# Patient Record
Sex: FEMALE | Race: WHITE | NOT HISPANIC OR LATINO | URBAN - METROPOLITAN AREA
[De-identification: names, ages, dates, MRNs, and addresses within clinical notes are randomized per-mention and may not be internally consistent; named-entity substitution may affect disease eponyms.]

---

## 2018-12-16 ENCOUNTER — EMERGENCY (EMERGENCY)
Facility: HOSPITAL | Age: 48
LOS: 1 days | Discharge: HOME | End: 2018-12-16
Attending: EMERGENCY MEDICINE | Admitting: EMERGENCY MEDICINE
Payer: COMMERCIAL

## 2018-12-16 VITALS
OXYGEN SATURATION: 97 % | HEART RATE: 76 BPM | RESPIRATION RATE: 18 BRPM | SYSTOLIC BLOOD PRESSURE: 140 MMHG | DIASTOLIC BLOOD PRESSURE: 90 MMHG | TEMPERATURE: 98 F | WEIGHT: 160.06 LBS | HEIGHT: 62 IN

## 2018-12-16 VITALS
HEART RATE: 70 BPM | RESPIRATION RATE: 15 BRPM | OXYGEN SATURATION: 98 % | DIASTOLIC BLOOD PRESSURE: 82 MMHG | SYSTOLIC BLOOD PRESSURE: 142 MMHG

## 2018-12-16 LAB
ALBUMIN SERPL ELPH-MCNC: 3.7 G/DL — SIGNIFICANT CHANGE UP (ref 3.3–5)
ALP SERPL-CCNC: 40 U/L — SIGNIFICANT CHANGE UP (ref 30–120)
ALT FLD-CCNC: 24 U/L DA — SIGNIFICANT CHANGE UP (ref 10–60)
ANION GAP SERPL CALC-SCNC: 5 MMOL/L — SIGNIFICANT CHANGE UP (ref 5–17)
APPEARANCE UR: CLEAR — SIGNIFICANT CHANGE UP
APTT BLD: 26.1 SEC — LOW (ref 28.5–37)
AST SERPL-CCNC: 12 U/L — SIGNIFICANT CHANGE UP (ref 10–40)
BACTERIA # UR AUTO: ABNORMAL
BILIRUB SERPL-MCNC: 0.2 MG/DL — SIGNIFICANT CHANGE UP (ref 0.2–1.2)
BILIRUB UR-MCNC: NEGATIVE — SIGNIFICANT CHANGE UP
BUN SERPL-MCNC: 12 MG/DL — SIGNIFICANT CHANGE UP (ref 7–23)
CALCIUM SERPL-MCNC: 8.9 MG/DL — SIGNIFICANT CHANGE UP (ref 8.4–10.5)
CHLORIDE SERPL-SCNC: 101 MMOL/L — SIGNIFICANT CHANGE UP (ref 96–108)
CO2 SERPL-SCNC: 28 MMOL/L — SIGNIFICANT CHANGE UP (ref 22–31)
COLOR SPEC: YELLOW — SIGNIFICANT CHANGE UP
CREAT SERPL-MCNC: 0.66 MG/DL — SIGNIFICANT CHANGE UP (ref 0.5–1.3)
D DIMER BLD IA.RAPID-MCNC: <150 NG/ML DDU — SIGNIFICANT CHANGE UP
DIFF PNL FLD: ABNORMAL
EPI CELLS # UR: ABNORMAL
GLUCOSE SERPL-MCNC: 95 MG/DL — SIGNIFICANT CHANGE UP (ref 70–99)
GLUCOSE UR QL: NEGATIVE MG/DL — SIGNIFICANT CHANGE UP
HCT VFR BLD CALC: 34.6 % — SIGNIFICANT CHANGE UP (ref 34.5–45)
HGB BLD-MCNC: 11.6 G/DL — SIGNIFICANT CHANGE UP (ref 11.5–15.5)
INR BLD: 1.07 RATIO — SIGNIFICANT CHANGE UP (ref 0.88–1.16)
KETONES UR-MCNC: NEGATIVE — SIGNIFICANT CHANGE UP
LEUKOCYTE ESTERASE UR-ACNC: ABNORMAL
MCHC RBC-ENTMCNC: 29.3 PG — SIGNIFICANT CHANGE UP (ref 27–34)
MCHC RBC-ENTMCNC: 33.5 GM/DL — SIGNIFICANT CHANGE UP (ref 32–36)
MCV RBC AUTO: 87.4 FL — SIGNIFICANT CHANGE UP (ref 80–100)
NITRITE UR-MCNC: NEGATIVE — SIGNIFICANT CHANGE UP
NRBC # BLD: 0 /100 WBCS — SIGNIFICANT CHANGE UP (ref 0–0)
PH UR: 8 — SIGNIFICANT CHANGE UP (ref 5–8)
PLATELET # BLD AUTO: 371 K/UL — SIGNIFICANT CHANGE UP (ref 150–400)
POTASSIUM SERPL-MCNC: 3.8 MMOL/L — SIGNIFICANT CHANGE UP (ref 3.5–5.3)
POTASSIUM SERPL-SCNC: 3.8 MMOL/L — SIGNIFICANT CHANGE UP (ref 3.5–5.3)
PROT SERPL-MCNC: 7.3 G/DL — SIGNIFICANT CHANGE UP (ref 6–8.3)
PROT UR-MCNC: NEGATIVE MG/DL — SIGNIFICANT CHANGE UP
PROTHROM AB SERPL-ACNC: 11.7 SEC — SIGNIFICANT CHANGE UP (ref 10–12.9)
RBC # BLD: 3.96 M/UL — SIGNIFICANT CHANGE UP (ref 3.8–5.2)
RBC # FLD: 14.4 % — SIGNIFICANT CHANGE UP (ref 10.3–14.5)
RBC CASTS # UR COMP ASSIST: ABNORMAL /HPF (ref 0–4)
SODIUM SERPL-SCNC: 134 MMOL/L — LOW (ref 135–145)
SP GR SPEC: 1.01 — SIGNIFICANT CHANGE UP (ref 1.01–1.02)
TROPONIN I SERPL-MCNC: 0 NG/ML — LOW (ref 0.02–0.06)
UROBILINOGEN FLD QL: NEGATIVE MG/DL — SIGNIFICANT CHANGE UP
WBC # BLD: 11.55 K/UL — HIGH (ref 3.8–10.5)
WBC # FLD AUTO: 11.55 K/UL — HIGH (ref 3.8–10.5)
WBC UR QL: SIGNIFICANT CHANGE UP

## 2018-12-16 PROCEDURE — 36415 COLL VENOUS BLD VENIPUNCTURE: CPT

## 2018-12-16 PROCEDURE — 93005 ELECTROCARDIOGRAM TRACING: CPT

## 2018-12-16 PROCEDURE — 85379 FIBRIN DEGRADATION QUANT: CPT

## 2018-12-16 PROCEDURE — 71046 X-RAY EXAM CHEST 2 VIEWS: CPT | Mod: 26

## 2018-12-16 PROCEDURE — 81001 URINALYSIS AUTO W/SCOPE: CPT

## 2018-12-16 PROCEDURE — 85610 PROTHROMBIN TIME: CPT

## 2018-12-16 PROCEDURE — 80053 COMPREHEN METABOLIC PANEL: CPT

## 2018-12-16 PROCEDURE — 71046 X-RAY EXAM CHEST 2 VIEWS: CPT

## 2018-12-16 PROCEDURE — 93010 ELECTROCARDIOGRAM REPORT: CPT

## 2018-12-16 PROCEDURE — 84484 ASSAY OF TROPONIN QUANT: CPT

## 2018-12-16 PROCEDURE — 99284 EMERGENCY DEPT VISIT MOD MDM: CPT

## 2018-12-16 PROCEDURE — 85730 THROMBOPLASTIN TIME PARTIAL: CPT

## 2018-12-16 PROCEDURE — 85027 COMPLETE CBC AUTOMATED: CPT

## 2018-12-16 PROCEDURE — 99284 EMERGENCY DEPT VISIT MOD MDM: CPT | Mod: 25

## 2018-12-16 RX ORDER — DICLOFENAC SODIUM 75 MG/1
1 TABLET, DELAYED RELEASE ORAL
Qty: 0 | Refills: 0 | COMMUNITY

## 2018-12-16 NOTE — ED PROVIDER NOTE - OBJECTIVE STATEMENT
47 y/o F pt presents to the ED c/o SOB and chest tightness since waking up this morning at 9am. Pt states that she has been feeling unwell for the past couple of weeks. She woke up this morning with chest tightness radiating to her neck and to her left side of chest. Non-smoker, occasional use of EtOH, no use of birth control pills. Family hx of cardiac disease. Denies fever, chills, nausea, vomiting, coughing, rhinorrhea, leg pain, or calf swelling. No other complaints at this time.

## 2018-12-16 NOTE — ED ADULT NURSE NOTE - CHPI ED NUR DURATION
Wife is calling and would also like referral put on waiting list for [facility] in case they can get in sooner. Please do both referrals done.    week(s)

## 2018-12-16 NOTE — ED ADULT NURSE NOTE - CHPI ED NUR SYMPTOMS NEG
no edema/no headache/no hemoptysis/no body aches/no chills/no cough/no diaphoresis/no wheezing/no fever/no shortness of breath

## 2018-12-16 NOTE — ED ADULT NURSE REASSESSMENT NOTE - NS ED NURSE REASSESS COMMENT FT1
pt feels fine  pt re evaluated by md and to be d'c/d  iv d'c/d  no s/s infiltration upon removal  pt discharged stable and ambulatory in nad at present d/c instruction reinforced and pt verbalized understanding vital signs as charted  pt advised to follow up pmd for further testing

## 2019-03-19 ENCOUNTER — EMERGENCY (EMERGENCY)
Facility: HOSPITAL | Age: 49
LOS: 1 days | Discharge: HOME | End: 2019-03-19
Attending: EMERGENCY MEDICINE
Payer: COMMERCIAL

## 2019-03-19 VITALS
SYSTOLIC BLOOD PRESSURE: 143 MMHG | RESPIRATION RATE: 13 BRPM | DIASTOLIC BLOOD PRESSURE: 68 MMHG | TEMPERATURE: 98 F | WEIGHT: 160.06 LBS | HEIGHT: 62 IN | HEART RATE: 73 BPM | OXYGEN SATURATION: 97 %

## 2019-03-19 PROCEDURE — 99283 EMERGENCY DEPT VISIT LOW MDM: CPT | Mod: 25

## 2019-03-19 PROCEDURE — 99053 MED SERV 10PM-8AM 24 HR FAC: CPT

## 2019-03-19 PROCEDURE — 73610 X-RAY EXAM OF ANKLE: CPT

## 2019-03-19 PROCEDURE — 99284 EMERGENCY DEPT VISIT MOD MDM: CPT

## 2019-03-19 PROCEDURE — 73630 X-RAY EXAM OF FOOT: CPT

## 2019-03-19 RX ORDER — IBUPROFEN 200 MG
600 TABLET ORAL ONCE
Qty: 0 | Refills: 0 | Status: COMPLETED | OUTPATIENT
Start: 2019-03-19 | End: 2019-03-19

## 2019-03-19 RX ADMIN — Medication 600 MILLIGRAM(S): at 23:47

## 2019-03-19 NOTE — ED ADULT NURSE NOTE - OBJECTIVE STATEMENT
Patient presents to ER complaining of right foot pain, reports injury wile at work when bed with patient fell on foot, no swelling noted, +pulses, denies PMH, resp even/unlabored.

## 2019-03-19 NOTE — ED PROVIDER NOTE - OBJECTIVE STATEMENT
48 year old female c/o right foot pain after having a bed drop on her foot. states that the bed was about 5 inches off the ground. states that she has previous stress fracture to the foot on the LEFT side. denies numbness or tingling of the foot. pain when ambulating. no medication taken at home.   has podiatrist that she follow up with. denies ankle or calf pain

## 2019-03-19 NOTE — ED ADULT TRIAGE NOTE - CHIEF COMPLAINT QUOTE
Pt was at work and a bed with a patient in it fell onto her right foot. Pt c/o pain. Ambulating without difficulty

## 2019-03-19 NOTE — ED PROVIDER NOTE - ATTENDING CONTRIBUTION TO CARE
I, Surendra Benton, performed a face to face bedside interview with this patient regarding history of present illness, review of symptoms and relevant past medical, social and family history.  I completed an independent physical examination. I have communicated the patient’s plan of care and disposition with the ACP.        48 year old female c/o right foot pain after having a bed drop on her foot. states that the bed was about 5 inches off the ground. states that she has previous stress frapecture to the foot on the LEFT side.  rt foot superfical abrasions; tender over dorsal side of foot; nontender medial and lateral malleolus;   xray ; pain meds and referral to podiatry

## 2019-03-19 NOTE — ED PROVIDER NOTE - PHYSICAL EXAMINATION
RIGHT FOOT: swelling to anterior aspect of foot with 2 abrasions no active bleeding. + TTP over dorsal aspect of foot. nttp over plantar aspect of foot. 2+ dp pulse sensation intact moving all toes. freely all nails intact without hematoma. NTTP over ankle no deformities. no calf tenderness.

## 2019-03-20 VITALS
TEMPERATURE: 98 F | SYSTOLIC BLOOD PRESSURE: 154 MMHG | DIASTOLIC BLOOD PRESSURE: 87 MMHG | OXYGEN SATURATION: 99 % | HEART RATE: 60 BPM | RESPIRATION RATE: 19 BRPM

## 2019-03-20 PROCEDURE — 73630 X-RAY EXAM OF FOOT: CPT | Mod: 26,RT

## 2019-03-20 PROCEDURE — 73610 X-RAY EXAM OF ANKLE: CPT | Mod: 26,RT

## 2019-06-11 NOTE — ED PROVIDER NOTE - NS HIV RISK FACTOR YES
Grade 3 diabetic ulcer right foot with OM  Goal: to take patient to OR tomorrow for debridement of wound  Patient will need long termIV antibiotics   All risk benefits and alternatives discussed with patient  COnsent signed  NPO tonight Declined

## 2021-05-03 ENCOUNTER — RESULT REVIEW (OUTPATIENT)
Age: 51
End: 2021-05-03

## 2021-12-02 ENCOUNTER — TRANSCRIPTION ENCOUNTER (OUTPATIENT)
Age: 51
End: 2021-12-02

## 2021-12-09 ENCOUNTER — TRANSCRIPTION ENCOUNTER (OUTPATIENT)
Age: 51
End: 2021-12-09

## 2022-12-10 ENCOUNTER — EMERGENCY (EMERGENCY)
Facility: HOSPITAL | Age: 52
LOS: 1 days | Discharge: HOME | End: 2022-12-10
Attending: EMERGENCY MEDICINE
Payer: COMMERCIAL

## 2022-12-10 VITALS
OXYGEN SATURATION: 97 % | RESPIRATION RATE: 20 BRPM | TEMPERATURE: 99 F | WEIGHT: 175.05 LBS | HEIGHT: 62 IN | HEART RATE: 101 BPM | SYSTOLIC BLOOD PRESSURE: 186 MMHG | DIASTOLIC BLOOD PRESSURE: 87 MMHG

## 2022-12-10 VITALS — OXYGEN SATURATION: 97 % | RESPIRATION RATE: 18 BRPM | HEART RATE: 88 BPM

## 2022-12-10 PROCEDURE — 71046 X-RAY EXAM CHEST 2 VIEWS: CPT | Mod: 26

## 2022-12-10 PROCEDURE — 71046 X-RAY EXAM CHEST 2 VIEWS: CPT

## 2022-12-10 PROCEDURE — 93010 ELECTROCARDIOGRAM REPORT: CPT

## 2022-12-10 PROCEDURE — 93005 ELECTROCARDIOGRAM TRACING: CPT

## 2022-12-10 PROCEDURE — 99284 EMERGENCY DEPT VISIT MOD MDM: CPT

## 2022-12-10 PROCEDURE — 99283 EMERGENCY DEPT VISIT LOW MDM: CPT | Mod: 25

## 2022-12-10 PROCEDURE — 94640 AIRWAY INHALATION TREATMENT: CPT

## 2022-12-10 RX ORDER — IPRATROPIUM/ALBUTEROL SULFATE 18-103MCG
3 AEROSOL WITH ADAPTER (GRAM) INHALATION
Qty: 24 | Refills: 0
Start: 2022-12-10 | End: 2022-12-11

## 2022-12-10 RX ORDER — IPRATROPIUM/ALBUTEROL SULFATE 18-103MCG
3 AEROSOL WITH ADAPTER (GRAM) INHALATION ONCE
Refills: 0 | Status: COMPLETED | OUTPATIENT
Start: 2022-12-10 | End: 2022-12-10

## 2022-12-10 RX ADMIN — Medication 40 MILLIGRAM(S): at 13:19

## 2022-12-10 RX ADMIN — Medication 3 MILLILITER(S): at 13:19

## 2022-12-10 NOTE — ED PROVIDER NOTE - CARE PLAN
Principal Discharge DX:	Systemic viral illness  Secondary Diagnosis:	Bronchospasm  Secondary Diagnosis:	Wheezing   1

## 2022-12-10 NOTE — ED PROVIDER NOTE - PATIENT PORTAL LINK FT
You can access the FollowMyHealth Patient Portal offered by [facility] by registering at the following website: [email or web address]. By joining [employer]’s FollowMyHealth portal, you will also be able to view your health information using other applications (apps) compatible with our system.

## 2022-12-10 NOTE — ED PROVIDER NOTE - PROGRESS NOTE DETAILS
On re-evaluation:  No wheezing noted on examination . Pt denies any current chest tightness or shortness of breath. Pt will be D/C with Rx sent ot her Pharmacy. Continue with current medication as discussed.

## 2022-12-10 NOTE — ED PROVIDER NOTE - NS ED ATTENDING STATEMENT MOD
Routing to [place] provider pool in Dr. Cage absence[facility]    Routing refill request to provider for review/approval because:  Medication is reported/historical    Rx pending approval if appropriate    April Koehler RN          
This was a shared visit with the NENITA. I reviewed and verified the documentation and independently performed the documented:

## 2022-12-10 NOTE — ED PROVIDER NOTE - OBJECTIVE STATEMENT
Detail Level: Zone 53-year-old female presented to ED complaining of wheezing, chest tightness and productive coughing for the last 4 days.  Patient explains that she was seen at Urgent care for her symptoms twice in the last week was for which she tested negative for COVID19 and influenza but RSV is still pending.  Patient admits to having a low-grade fever denies any other symptoms.  Patient admits to history of reactive airway disease for which she is currently on albuterol but states that her albuterol did not help her symptoms.  Patient denies any abdominal ,pain chest pain , nausea , vomiting or generalized body aches. Detail Level: Simple

## 2022-12-10 NOTE — ED PROVIDER NOTE - CLINICAL SUMMARY MEDICAL DECISION MAKING FREE TEXT BOX
53-year-old female presented to ED complaining of wheezing, chest tightness and productive coughing for the last 4 days.  Patient explains that she was seen at Urgent care for her symptoms twice in the last week was for which she tested negative for COVID19 and influenza but RSV is still pending.  Patient admits to having a low-grade fever denies any other symptoms. Examination + minimal wheezing in all lung fields.

## 2022-12-10 NOTE — ED ADULT NURSE NOTE - OBJECTIVE STATEMENT
Pt received in supertrack.  Patient A&Ox4 complaining of SOB.  Pt with hx of RAD, reports expiratory wheezing.  Has been having flu like sx for the past few days. Denies CP, N, V.  Cardiac monitoring and pulse oximetry initiated, NSR on monitor, O2 sat 98% on RA. NAD noted, respirations even and unlabored.  Safety precautions in place.  Plan of care explained, pt verbalized understanding.

## 2022-12-10 NOTE — ED ADULT TRIAGE NOTE - CHIEF COMPLAINT QUOTE
" I'm having SOB and chest tightness" pt states she was sick for a couple of days, sinus infection. pt states her inhaler is not working. pt also co wheezing

## 2022-12-10 NOTE — ED PROVIDER NOTE - NSFOLLOWUPINSTRUCTIONS_ED_ALL_ED_FT
Continue current medication as discussed.  Albuterol inhaler 2 puffs every 4-6 hours.  Prednisone 2 pills once a day for the next 3 days  Follow-up primary care physician as discussed

## 2023-08-07 ENCOUNTER — EMERGENCY (EMERGENCY)
Facility: HOSPITAL | Age: 53
LOS: 1 days | Discharge: HOME | End: 2023-08-07
Attending: EMERGENCY MEDICINE
Payer: COMMERCIAL

## 2023-08-07 VITALS — HEIGHT: 62 IN | WEIGHT: 173.94 LBS

## 2023-08-07 VITALS
OXYGEN SATURATION: 99 % | DIASTOLIC BLOOD PRESSURE: 74 MMHG | RESPIRATION RATE: 18 BRPM | HEART RATE: 62 BPM | SYSTOLIC BLOOD PRESSURE: 149 MMHG

## 2023-08-07 LAB
ALBUMIN SERPL ELPH-MCNC: 4.2 G/DL — SIGNIFICANT CHANGE UP (ref 3.3–5.2)
ALP SERPL-CCNC: 35 U/L — LOW (ref 40–120)
ALT FLD-CCNC: 15 U/L — SIGNIFICANT CHANGE UP
ANION GAP SERPL CALC-SCNC: 12 MMOL/L — SIGNIFICANT CHANGE UP (ref 5–17)
APPEARANCE UR: ABNORMAL
AST SERPL-CCNC: 14 U/L — SIGNIFICANT CHANGE UP
BACTERIA # UR AUTO: ABNORMAL
BASOPHILS # BLD AUTO: 0.07 K/UL — SIGNIFICANT CHANGE UP (ref 0–0.2)
BASOPHILS NFR BLD AUTO: 0.6 % — SIGNIFICANT CHANGE UP (ref 0–2)
BILIRUB SERPL-MCNC: <0.2 MG/DL — LOW (ref 0.4–2)
BILIRUB UR-MCNC: NEGATIVE — SIGNIFICANT CHANGE UP
BUN SERPL-MCNC: 15.2 MG/DL — SIGNIFICANT CHANGE UP (ref 8–20)
CALCIUM SERPL-MCNC: 8.6 MG/DL — SIGNIFICANT CHANGE UP (ref 8.4–10.5)
CHLORIDE SERPL-SCNC: 99 MMOL/L — SIGNIFICANT CHANGE UP (ref 96–108)
CO2 SERPL-SCNC: 24 MMOL/L — SIGNIFICANT CHANGE UP (ref 22–29)
COLOR SPEC: YELLOW — SIGNIFICANT CHANGE UP
CREAT SERPL-MCNC: 0.57 MG/DL — SIGNIFICANT CHANGE UP (ref 0.5–1.3)
DIFF PNL FLD: ABNORMAL
EGFR: 109 ML/MIN/1.73M2 — SIGNIFICANT CHANGE UP
EOSINOPHIL # BLD AUTO: 0.31 K/UL — SIGNIFICANT CHANGE UP (ref 0–0.5)
EOSINOPHIL NFR BLD AUTO: 2.8 % — SIGNIFICANT CHANGE UP (ref 0–6)
EPI CELLS # UR: SIGNIFICANT CHANGE UP
GLUCOSE SERPL-MCNC: 86 MG/DL — SIGNIFICANT CHANGE UP (ref 70–99)
GLUCOSE UR QL: NEGATIVE MG/DL — SIGNIFICANT CHANGE UP
HCT VFR BLD CALC: 34 % — LOW (ref 34.5–45)
HGB BLD-MCNC: 11.1 G/DL — LOW (ref 11.5–15.5)
IMM GRANULOCYTES NFR BLD AUTO: 0.5 % — SIGNIFICANT CHANGE UP (ref 0–0.9)
KETONES UR-MCNC: NEGATIVE — SIGNIFICANT CHANGE UP
LEUKOCYTE ESTERASE UR-ACNC: ABNORMAL
LIDOCAIN IGE QN: 25 U/L — SIGNIFICANT CHANGE UP (ref 22–51)
LYMPHOCYTES # BLD AUTO: 3.98 K/UL — HIGH (ref 1–3.3)
LYMPHOCYTES # BLD AUTO: 36 % — SIGNIFICANT CHANGE UP (ref 13–44)
MCHC RBC-ENTMCNC: 28.5 PG — SIGNIFICANT CHANGE UP (ref 27–34)
MCHC RBC-ENTMCNC: 32.6 GM/DL — SIGNIFICANT CHANGE UP (ref 32–36)
MCV RBC AUTO: 87.2 FL — SIGNIFICANT CHANGE UP (ref 80–100)
MONOCYTES # BLD AUTO: 0.75 K/UL — SIGNIFICANT CHANGE UP (ref 0–0.9)
MONOCYTES NFR BLD AUTO: 6.8 % — SIGNIFICANT CHANGE UP (ref 2–14)
NEUTROPHILS # BLD AUTO: 5.88 K/UL — SIGNIFICANT CHANGE UP (ref 1.8–7.4)
NEUTROPHILS NFR BLD AUTO: 53.3 % — SIGNIFICANT CHANGE UP (ref 43–77)
NITRITE UR-MCNC: NEGATIVE — SIGNIFICANT CHANGE UP
PH UR: 5 — SIGNIFICANT CHANGE UP (ref 5–8)
PLATELET # BLD AUTO: 332 K/UL — SIGNIFICANT CHANGE UP (ref 150–400)
POTASSIUM SERPL-MCNC: 3.7 MMOL/L — SIGNIFICANT CHANGE UP (ref 3.5–5.3)
POTASSIUM SERPL-SCNC: 3.7 MMOL/L — SIGNIFICANT CHANGE UP (ref 3.5–5.3)
PROT SERPL-MCNC: 6.5 G/DL — LOW (ref 6.6–8.7)
PROT UR-MCNC: 15
RBC # BLD: 3.9 M/UL — SIGNIFICANT CHANGE UP (ref 3.8–5.2)
RBC # FLD: 13.4 % — SIGNIFICANT CHANGE UP (ref 10.3–14.5)
RBC CASTS # UR COMP ASSIST: ABNORMAL /HPF (ref 0–4)
SODIUM SERPL-SCNC: 135 MMOL/L — SIGNIFICANT CHANGE UP (ref 135–145)
SP GR SPEC: 1.02 — SIGNIFICANT CHANGE UP (ref 1.01–1.02)
UROBILINOGEN FLD QL: NEGATIVE MG/DL — SIGNIFICANT CHANGE UP
WBC # BLD: 11.05 K/UL — HIGH (ref 3.8–10.5)
WBC # FLD AUTO: 11.05 K/UL — HIGH (ref 3.8–10.5)
WBC UR QL: ABNORMAL /HPF (ref 0–5)

## 2023-08-07 PROCEDURE — 96375 TX/PRO/DX INJ NEW DRUG ADDON: CPT

## 2023-08-07 PROCEDURE — 84702 CHORIONIC GONADOTROPIN TEST: CPT

## 2023-08-07 PROCEDURE — 99285 EMERGENCY DEPT VISIT HI MDM: CPT

## 2023-08-07 PROCEDURE — 74177 CT ABD & PELVIS W/CONTRAST: CPT | Mod: MA

## 2023-08-07 PROCEDURE — 80053 COMPREHEN METABOLIC PANEL: CPT

## 2023-08-07 PROCEDURE — 96365 THER/PROPH/DIAG IV INF INIT: CPT | Mod: XU

## 2023-08-07 PROCEDURE — 85025 COMPLETE CBC W/AUTO DIFF WBC: CPT

## 2023-08-07 PROCEDURE — 83690 ASSAY OF LIPASE: CPT

## 2023-08-07 PROCEDURE — 99284 EMERGENCY DEPT VISIT MOD MDM: CPT | Mod: 25

## 2023-08-07 PROCEDURE — 36415 COLL VENOUS BLD VENIPUNCTURE: CPT

## 2023-08-07 PROCEDURE — 74177 CT ABD & PELVIS W/CONTRAST: CPT | Mod: 26,MA

## 2023-08-07 PROCEDURE — 81001 URINALYSIS AUTO W/SCOPE: CPT

## 2023-08-07 RX ORDER — SODIUM CHLORIDE 9 MG/ML
1000 INJECTION INTRAMUSCULAR; INTRAVENOUS; SUBCUTANEOUS ONCE
Refills: 0 | Status: COMPLETED | OUTPATIENT
Start: 2023-08-07 | End: 2023-08-07

## 2023-08-07 RX ORDER — CEFPODOXIME PROXETIL 100 MG
1 TABLET ORAL
Qty: 14 | Refills: 0
Start: 2023-08-07 | End: 2023-08-13

## 2023-08-07 RX ORDER — CEFTRIAXONE 500 MG/1
1000 INJECTION, POWDER, FOR SOLUTION INTRAMUSCULAR; INTRAVENOUS ONCE
Refills: 0 | Status: COMPLETED | OUTPATIENT
Start: 2023-08-07 | End: 2023-08-07

## 2023-08-07 RX ORDER — KETOROLAC TROMETHAMINE 30 MG/ML
30 SYRINGE (ML) INJECTION ONCE
Refills: 0 | Status: DISCONTINUED | OUTPATIENT
Start: 2023-08-07 | End: 2023-08-07

## 2023-08-07 RX ORDER — ACETAMINOPHEN 500 MG
1000 TABLET ORAL ONCE
Refills: 0 | Status: COMPLETED | OUTPATIENT
Start: 2023-08-07 | End: 2023-08-07

## 2023-08-07 RX ADMIN — Medication 30 MILLIGRAM(S): at 14:31

## 2023-08-07 RX ADMIN — Medication 1000 MILLIGRAM(S): at 09:50

## 2023-08-07 RX ADMIN — SODIUM CHLORIDE 1000 MILLILITER(S): 9 INJECTION INTRAMUSCULAR; INTRAVENOUS; SUBCUTANEOUS at 09:49

## 2023-08-07 RX ADMIN — CEFTRIAXONE 1000 MILLIGRAM(S): 500 INJECTION, POWDER, FOR SOLUTION INTRAMUSCULAR; INTRAVENOUS at 09:48

## 2023-08-07 RX ADMIN — SODIUM CHLORIDE 1000 MILLILITER(S): 9 INJECTION INTRAMUSCULAR; INTRAVENOUS; SUBCUTANEOUS at 08:56

## 2023-08-07 RX ADMIN — Medication 400 MILLIGRAM(S): at 08:51

## 2023-08-07 NOTE — ED PROVIDER NOTE - GASTROINTESTINAL, MLM
Abdomen soft, mild diffuse tenderness greatest in suprapubic region, no rebound, rigidity, or guarding

## 2023-08-07 NOTE — ED PROVIDER NOTE - ATTENDING CONTRIBUTION TO CARE
I, Jacquelyn Louis, performed the initial face to face bedside interview with this patient regarding history of present illness, review of symptoms and relevant past medical, social and family history.  I completed an independent physical examination.  I was the initial provider who evaluated this patient. I have signed out the follow up of any pending tests (i.e. labs, radiological studies) to the [facility].  I have communicated the patient’s plan of care and disposition with the resident.  The history, relevant review of systems, past medical and surgical history, medical decision making, and physical examination was documented by the scribe in my presence and I attest to the accuracy of the documentation.

## 2023-08-07 NOTE — ED PROVIDER NOTE - OBJECTIVE STATEMENT
52 y/o female with PMHx of diverticulosis presents to the ED c/o lower abdominal pain described as pressure sensation. Pt states pain has been 9/10 and has kept her up a night, is currently 5/10. Pt denies N/V/D, has not had diverticulitis in past. Pt states a few weeks ago she was treated for UTI with Cipro.

## 2023-08-07 NOTE — ED ADULT TRIAGE NOTE - CHIEF COMPLAINT QUOTE
pt states she has abd pain, HX diverticulitis, onset 2 weeks ago, also had a UTI 1 month ago, still feeling pressure & pain  A&Ox3, res[ wnl;

## 2023-08-07 NOTE — ED ADULT NURSE NOTE - OBJECTIVE STATEMENT
Assumed pt care in [facility].  Pt is alert and oriented X 4 c/o suprapubic pain "feels like my intestines."  Hx of interstitial cystitis and was treated for a UTI a month ago.  Suprapubic pain has been ongoing X 1 week.

## 2023-08-07 NOTE — ED PROVIDER NOTE - PATIENT PORTAL LINK FT
You can access the FollowMyHealth Patient Portal offered by [facility] by registering at the following website: [email or web address]. By joining [employer]’s FollowMyHealth portal, you will also be able to view your health information using other applications (apps) compatible with our system.

## 2023-08-07 NOTE — ED PROVIDER NOTE - CLINICAL SUMMARY MEDICAL DECISION MAKING FREE TEXT BOX
54 y/o female with PMHx of diverticulosis but not diverticulitis p/w lower abdominal pain, will obtain labs, UA, CT to further assess pt condition, treat with IV acetaminophen for pain control, assess pt response to treatment and reassess.

## 2023-08-07 NOTE — ED PROVIDER NOTE - PROGRESS NOTE DETAILS
JK - labs WNL; UA positive for UTI. CT showing nonobstructive renal stones. Belly soft nontender, tolerating PO, pain well controlled. Ready for DC with return precautions, abx scripts.

## 2023-08-07 NOTE — ED PROVIDER NOTE - NSFOLLOWUPINSTRUCTIONS_ED_ALL_ED_FT
Dysuria    WHAT YOU NEED TO KNOW:    What is dysuria? Dysuria is difficulty urinating, or pain, burning, or discomfort when you urinate. Dysuria is usually a symptom of another problem.    What causes dysuria? The following are the most common causes of dysuria:    Infections, such as urinary tract infections and sexually transmitted infections    Trauma, such as bicycle injury or sexual abuse    Abnormal structure, such as narrowing of the urethra    Blockage, such as kidney stones    Medical conditions, such as constipation, enlarged prostate, and reactive arthritis    Chemicals, such as douches, spermicides, and bubble bath    Medicines, such as chemotherapy  What increases my risk for dysuria?    Dehydration    Loss of bladder muscle strength due to older age    Holding urine in your bladder for a long period of time    Caffeine, soda, alcohol, and citrus drinks  What other symptoms may I have with dysuria?    Fever    Cloudy, bad smelling urine    Urge to urinate often but urinating little    Back, side, or abdominal pain    Blood in your urine    Discharge that smells bad    Itching    Swelling of your genitals    Pain with ejaculation or bowel movement (for males)  How is dysuria diagnosed? Your healthcare provider will examine you and ask about your symptoms. Tell your healthcare provider about any medicines you are taking. You may need any of the following to find the cause of your dysuria:    A urine test may be done to look for bacteria, blood, or pus.    A blood test may be done to look for signs of infection.    A cystoscopy allows healthcare providers to look for problems inside your bladder. A scope is put into your bladder through your urethra. The scope is a flexible tube with a light and camera on the end.    An ultrasound uses sound waves to show pictures on a monitor. An ultrasound may be done to show problems in your bladder.  How is dysuria treated? Treatment will depend on what is causing your dysuria. Your healthcare provider may refer you to a specialist, such as a urologist or a nephrologist. You may need medicines to help treat a bacterial infection or help decrease bladder spasms.    How can I manage my dysuria?    Drink more liquids. Liquids help flush out bacteria that may be causing an infection. Ask your healthcare provider how much liquid to drink each day and which liquids are best for you.    Take sitz baths as directed. Fill a bathtub with 4 to 6 inches of warm water. You may also use a sitz bath pan that fits over a toilet. Sit in the sitz bath for 20 minutes. Do this 2 to 3 times a day, or as directed. The warm water can help decrease pain and swelling.   When should I seek immediate care?    You have severe back, side, or abdominal pain.    You have fever and shaking chills.    You vomit several times in a row.  When should I contact my healthcare provider?    Your symptoms do not go away, even after treatment.    You have questions or concerns about your condition or care.  CARE AGREEMENT:    You have the right to help plan your care. Learn about your health condition and how it may be treated. Discuss treatment options with your healthcare providers to decide what care you want to receive. You always have the right to refuse treatment.    © [employer] 1973, 2023

## 2023-08-07 NOTE — ED PROVIDER NOTE - CARE PROVIDER_API CALL
Juancarlos Camejo  Urology  [address] [place] [address]  Phone: [phone]  Fax: [phone]  Follow Up Time: 1-3 Days

## 2024-05-14 NOTE — ED ADULT NURSE NOTE - NSFALLRSKHARMRISK_ED_ALL_ED
[facility]    Jill Tse Patient Status:  Outpatient   Age/Gender 80 year old female MRN [id number]   Location [facility] MRI Attending Sanjay Flores MD   Hosp Day # 0 PCP SHANTA MUNROE       Anesthesia Post-op Note        Procedure Summary       Date: 05/14/24 Room / Location: [facility] Perioperative Service; [facility] MRI    Anesthesia Start: 1258 Anesthesia Stop: 1512    Procedure: MRI SPINE CERVICAL (W+WO) (CPT=72156) Diagnosis: Paraplegia (HCC)    Scheduled Providers: Trevor Newton MD Anesthesiologist: Trevor Newton MD    Anesthesia Type: general ASA Status: 2            Anesthesia Type: general    Vitals Value Taken Time   /76 05/14/24 1515   Temp 97.8 °F (36.6 °C) 05/14/24 1510   Pulse 79 05/14/24 1515   Resp 16 05/14/24 1515   SpO2 95 % 05/14/24 1515   Vitals shown include unfiled device data.    Patient Location: PACU    Anesthesia Type: general    Airway Patency: patent    Postop Pain Control: adequate    Mental Status: mildly sedated but able to meaningfully participate in the post-anesthesia evaluation    Nausea/Vomiting: none    Cardiopulmonary/Hydration status: stable euvolemic    Complications: no apparent anesthesia related complications    Postop vital signs: stable    Dental Exam: Unchanged from Preop    Patient to be discharged from PACU when criteria met.           no